# Patient Record
Sex: MALE | Race: OTHER | Employment: FULL TIME | URBAN - METROPOLITAN AREA
[De-identification: names, ages, dates, MRNs, and addresses within clinical notes are randomized per-mention and may not be internally consistent; named-entity substitution may affect disease eponyms.]

---

## 2024-02-27 ENCOUNTER — OFFICE VISIT (OUTPATIENT)
Dept: FAMILY MEDICINE CLINIC | Facility: CLINIC | Age: 74
End: 2024-02-27
Payer: MEDICARE

## 2024-02-27 ENCOUNTER — LAB ENCOUNTER (OUTPATIENT)
Dept: LAB | Age: 74
End: 2024-02-27
Attending: FAMILY MEDICINE
Payer: MEDICARE

## 2024-02-27 VITALS
HEART RATE: 79 BPM | SYSTOLIC BLOOD PRESSURE: 108 MMHG | DIASTOLIC BLOOD PRESSURE: 62 MMHG | BODY MASS INDEX: 30.42 KG/M2 | HEIGHT: 74 IN | TEMPERATURE: 98 F | RESPIRATION RATE: 18 BRPM | WEIGHT: 237 LBS

## 2024-02-27 DIAGNOSIS — R35.1 BPH ASSOCIATED WITH NOCTURIA: ICD-10-CM

## 2024-02-27 DIAGNOSIS — Z12.11 SCREEN FOR COLON CANCER: ICD-10-CM

## 2024-02-27 DIAGNOSIS — R73.09 ELEVATED GLUCOSE: Primary | ICD-10-CM

## 2024-02-27 DIAGNOSIS — Z00.00 ENCOUNTER FOR ANNUAL WELLNESS EXAM IN MEDICARE PATIENT: Primary | ICD-10-CM

## 2024-02-27 DIAGNOSIS — R73.09 ELEVATED GLUCOSE: ICD-10-CM

## 2024-02-27 DIAGNOSIS — E78.00 HYPERCHOLESTEROLEMIA: ICD-10-CM

## 2024-02-27 DIAGNOSIS — R58 ECCHYMOSIS: ICD-10-CM

## 2024-02-27 DIAGNOSIS — H26.9 CATARACT, UNSPECIFIED CATARACT TYPE, UNSPECIFIED LATERALITY: ICD-10-CM

## 2024-02-27 DIAGNOSIS — N40.1 BPH ASSOCIATED WITH NOCTURIA: ICD-10-CM

## 2024-02-27 LAB
ALBUMIN SERPL-MCNC: 3.9 G/DL (ref 3.4–5)
ALBUMIN/GLOB SERPL: 1 {RATIO} (ref 1–2)
ALP LIVER SERPL-CCNC: 77 U/L
ALT SERPL-CCNC: 26 U/L
ANION GAP SERPL CALC-SCNC: 4 MMOL/L (ref 0–18)
AST SERPL-CCNC: 17 U/L (ref 15–37)
BASOPHILS # BLD AUTO: 0.05 X10(3) UL (ref 0–0.2)
BASOPHILS NFR BLD AUTO: 1 %
BILIRUB SERPL-MCNC: 0.7 MG/DL (ref 0.1–2)
BUN BLD-MCNC: 16 MG/DL (ref 9–23)
CALCIUM BLD-MCNC: 9.4 MG/DL (ref 8.5–10.1)
CHLORIDE SERPL-SCNC: 109 MMOL/L (ref 98–112)
CHOLEST SERPL-MCNC: 216 MG/DL (ref ?–200)
CO2 SERPL-SCNC: 25 MMOL/L (ref 21–32)
COMPLEXED PSA SERPL-MCNC: 0.78 NG/ML (ref ?–4)
CREAT BLD-MCNC: 1.2 MG/DL
EGFRCR SERPLBLD CKD-EPI 2021: 65 ML/MIN/1.73M2 (ref 60–?)
EOSINOPHIL # BLD AUTO: 0.23 X10(3) UL (ref 0–0.7)
EOSINOPHIL NFR BLD AUTO: 4.7 %
ERYTHROCYTE [DISTWIDTH] IN BLOOD BY AUTOMATED COUNT: 13.1 %
FASTING PATIENT LIPID ANSWER: YES
FASTING STATUS PATIENT QL REPORTED: YES
GLOBULIN PLAS-MCNC: 4.1 G/DL (ref 2.8–4.4)
GLUCOSE BLD-MCNC: 111 MG/DL (ref 70–99)
HCT VFR BLD AUTO: 41.6 %
HDLC SERPL-MCNC: 33 MG/DL (ref 40–59)
HGB BLD-MCNC: 14.1 G/DL
IMM GRANULOCYTES # BLD AUTO: 0.02 X10(3) UL (ref 0–1)
IMM GRANULOCYTES NFR BLD: 0.4 %
LDLC SERPL CALC-MCNC: 141 MG/DL (ref ?–100)
LYMPHOCYTES # BLD AUTO: 1.91 X10(3) UL (ref 1–4)
LYMPHOCYTES NFR BLD AUTO: 39 %
MCH RBC QN AUTO: 31.1 PG (ref 26–34)
MCHC RBC AUTO-ENTMCNC: 33.9 G/DL (ref 31–37)
MCV RBC AUTO: 91.8 FL
MONOCYTES # BLD AUTO: 0.47 X10(3) UL (ref 0.1–1)
MONOCYTES NFR BLD AUTO: 9.6 %
NEUTROPHILS # BLD AUTO: 2.22 X10 (3) UL (ref 1.5–7.7)
NEUTROPHILS # BLD AUTO: 2.22 X10(3) UL (ref 1.5–7.7)
NEUTROPHILS NFR BLD AUTO: 45.3 %
NONHDLC SERPL-MCNC: 183 MG/DL (ref ?–130)
OSMOLALITY SERPL CALC.SUM OF ELEC: 288 MOSM/KG (ref 275–295)
PLATELET # BLD AUTO: 229 10(3)UL (ref 150–450)
POTASSIUM SERPL-SCNC: 4.1 MMOL/L (ref 3.5–5.1)
PROT SERPL-MCNC: 8 G/DL (ref 6.4–8.2)
RBC # BLD AUTO: 4.53 X10(6)UL
SODIUM SERPL-SCNC: 138 MMOL/L (ref 136–145)
TRIGL SERPL-MCNC: 231 MG/DL (ref 30–149)
VLDLC SERPL CALC-MCNC: 43 MG/DL (ref 0–30)
WBC # BLD AUTO: 4.9 X10(3) UL (ref 4–11)

## 2024-02-27 PROCEDURE — 96160 PT-FOCUSED HLTH RISK ASSMT: CPT | Performed by: FAMILY MEDICINE

## 2024-02-27 PROCEDURE — 3008F BODY MASS INDEX DOCD: CPT | Performed by: FAMILY MEDICINE

## 2024-02-27 PROCEDURE — 1125F AMNT PAIN NOTED PAIN PRSNT: CPT | Performed by: FAMILY MEDICINE

## 2024-02-27 PROCEDURE — 1159F MED LIST DOCD IN RCRD: CPT | Performed by: FAMILY MEDICINE

## 2024-02-27 PROCEDURE — G0439 PPPS, SUBSEQ VISIT: HCPCS | Performed by: FAMILY MEDICINE

## 2024-02-27 PROCEDURE — 80061 LIPID PANEL: CPT

## 2024-02-27 PROCEDURE — 85025 COMPLETE CBC W/AUTO DIFF WBC: CPT

## 2024-02-27 PROCEDURE — 3074F SYST BP LT 130 MM HG: CPT | Performed by: FAMILY MEDICINE

## 2024-02-27 PROCEDURE — 83036 HEMOGLOBIN GLYCOSYLATED A1C: CPT

## 2024-02-27 PROCEDURE — 1160F RVW MEDS BY RX/DR IN RCRD: CPT | Performed by: FAMILY MEDICINE

## 2024-02-27 PROCEDURE — 3078F DIAST BP <80 MM HG: CPT | Performed by: FAMILY MEDICINE

## 2024-02-27 PROCEDURE — 80053 COMPREHEN METABOLIC PANEL: CPT

## 2024-02-27 PROCEDURE — 1170F FXNL STATUS ASSESSED: CPT | Performed by: FAMILY MEDICINE

## 2024-02-27 NOTE — PROGRESS NOTES
Subjective:   Ron Sawant is a 73 year old male who presents for a Medicare Initial Preventative Physical Exam (Welcome to Medicare- < 12 months on Medicare) and good health with no chronic problems.   Presents to [facility] care.  The patient states that he has been out of the country and has recently returned.  States that he has not been on any medications states that he did have a screening colonoscopy about 8 years ago.  Has had a discectomy of L4-L5 does have a remote smoking history.  Denies any chest pain nausea vomiting diarrhea constipation.    History/Other:   Fall Risk Assessment:   He has been screened for Falls and is High Risk. Fall Prevention information provided to patient in After Visit Summary.    Do you feel unsteady when standing or walking?: No  Do you worry about falling?: No  Have you fallen in the past year?: Yes  How many times have you fallen?: 3  Were you injured?: Yes     Cognitive Assessment:   Abnormal  What day of the week is this?: Correct  What month is it?: Correct  What year is it?: Correct  Recall \"Ball\": Correct  Recall \"Flag\": Correct  Recall \"Tree\": Incorrect    Functional Ability/Status:   Ron Sawant has some abnormal functions as listed below:  He has Hearing problems based on screening of functional status.      Depression Screening (PHQ-2/PHQ-9): PHQ-2 SCORE: 0  , done 2/27/2024         Advanced Directives:   He does NOT have a Living Will. [Do you have a living will?: No]  He does NOT have a Power of  for Health Care. [Do you have a healthcare power of ?: No]  Discussed Advance Care Planning with patient (and family/surrogate if present). Standard forms made available to patient in After Visit Summary.      Patient Active Problem List   Diagnosis    Flatulence, eructation and gas pain     Allergies:  He has No Known Allergies.    Current Medications:  No outpatient medications have been marked as taking for the 2/27/24 encounter (Office Visit) with  Donnie Ghosh MD.       Medical History:  He  has no past medical history on file.  Surgical History:  He  has a past surgical history that includes Lumbar discectomy.   Family History:  His family history is not on file.  Social History:  He  reports that he quit smoking about 13 years ago. His smoking use included cigarettes. He has never used smokeless tobacco. He reports that he does not drink alcohol and does not use drugs.    Tobacco:  He smoked tobacco in the past but quit greater than 12 months ago.  Social History    Tobacco Use      Smoking status: Former        Types: Cigarettes        Quit date:         Years since quittin.1      Smokeless tobacco: Never       CAGE Alcohol Screen:   CAGE screening score of 0 on 2024, showing low risk of alcohol abuse.      Patient Care Team:  Donnie Ghosh MD as PCP - General (Family Medicine)    Review of Systems  Consitutional: No fevers, chills, sweats  Eye: No recent visual problems  ENMT: No ear pain nasal congestion sore throat  Respiratory: No shortness of breath, cough  Cardiovascular: No chest pain palpitations syncope  Gastrointestinal: No nausea vomiting diarrhea  Genitourinary: No hematuria  Hema/Lymph no bruising tendency, swollen lymph glands  Endocrine: Negative for excessive thirst excessive hunger  Musculoskeletal: No back pain neck pain joint pain muscle pain decreased range of motion  Integumentary: No rash, pruritus, abrasions  Neurologic: Alert and oriented x4  Psychiatric: No anxiety, depression    Medical, surgical, family, and social histories were reviewed      Objective:   Physical Exam      VITALS: Vitals reviewed   GENERAL: well developed, well nourished, in no apparent distress  SKIN: no rashes, no suspicious lesions: Cool and Dry  HEENT: atraumatic, normocephalic, ears and throat are clear bilateral tympanic membranes lucent nonbulging intact nose without bleeding purulent discharge or septal hematoma.    EYES: Pupils  equal round and reactive.  Extraocular motions intact no scleral icterus no injection or drainage  THROAT without erythema tonsillar hypertrophy or exudate.  Uvula midline airway patent                Hearing Assessed via: Whispered Voice  NECK: trachea midline.  No JVD or lymphadenopathy supple nontender no meningeal signs   LUNGS: clear to auscultation sounds equal bilaterally no wheezes rales or rhonchi  CARDIO: Regular rate and rhythm without murmurs gallops or rubs  GI: Soft nontender nondistended no hepatomegaly palpable masses.  No guarding.   without deformity or crepitus no flank tenderness  EXTREMITIES: no cyanosis, clubbing or edema no joint tenderness effusion or edema noted.  No calf tenderness negative Homans' sign bilaterally  NEURO: Awake and alert.  Cranial nerves II through XII intact motor and sensory grossly within normal limits.  5 out of 5 muscle strength in all muscle groups.  Normal speech.  PSYCHIATRIC: Awake, alert and oriented x3, cooperative appropriate mood and affect.  Judgment intact        /62 (BP Location: Right arm, Patient Position: Sitting, Cuff Size: adult)   Pulse 79   Temp 97.6 °F (36.4 °C) (Temporal)   Resp 18   Ht 6' 2\" (1.88 m)   Wt 237 lb (107.5 kg)   BMI 30.43 kg/m²  Estimated body mass index is 30.43 kg/m² as calculated from the following:    Height as of this encounter: 6' 2\" (1.88 m).    Weight as of this encounter: 237 lb (107.5 kg).    Medicare Hearing Assessment:       Visual Acuity:   Right Eye Visual Acuity: Uncorrected Right Eye Chart Acuity: 20/40   Left Eye Visual Acuity: Uncorrected Left Eye Chart Acuity: 20/70   Both Eyes Visual Acuity: Uncorrected Both Eyes Chart Acuity: 20/40   Able To Tolerate Visual Acuity: Yes        Assessment & Plan:   Ron Sawant is a 73 year old male who presents for a Medicare Assessment.     1. Encounter for annual wellness exam in Medicare patient (Primary)    I did discuss with the patient at this time  would be updating blood work you need a CMP lipid panel CBC as well as PSA.  He was asked to update his immunizations he was asked to follow-up at the pharmacy to complete his shingles vaccine series.  He is up-to-date with all other immunizations.  Will also be referring him to have a Cologuard completed he denies any family history of prostate or colon cancer.  Was asked to follow-up yearly for regular physical exams or for any other acute concerns.  Also be referring him to ophthalmology as he states he was previously told he had a cataracts of his left eye.          2. Elevated glucose  3. BPH associated with nocturia  -     PSA Total, Screen; Future; Expected date: 02/27/2024  4. Hypercholesterolemia  -     Comp Metabolic Panel (14); Future; Expected date: 02/27/2024  -     Lipid Panel; Future; Expected date: 02/27/2024  5. Screen for colon cancer  6. Ecchymosis  -     CBC With Differential With Platelet; Future; Expected date: 02/27/2024  7. Cataract, unspecified cataract type, unspecified laterality  -     Ophthalmology Referral - In Network        The patient presented today for Medicare annual wellness visit.  During the course of today's visit the health risk assessment past medical family and social histories were updated and reviewed with the patient.  The patient was educated and counseled about appropriate screening and preventative services and these were ordered as appropriate.  Referrals for educational and counseling services were made where indicated.  Advance directives were discussed.  A copy of the recommended preventative screenings as well as discharge instructions were provided to the patient.  End-of-life planning was discussed advanced directives were also discussed and are in place.          The patient indicates understanding of these issues and agrees to the plan.  Lab work ordered.  Patient reassured.  Reinforced healthy diet, lifestyle, and exercise.      No follow-ups on file.      Donnie Ghosh MD, 2/27/2024     Supplementary Documentation:   General Health:  In the past six months, have you lost more than 10 pounds without trying?: 2 - No  Has your appetite been poor?: No  Type of Diet: Balanced  How does the patient maintain a good energy level?: Daily Walks;Stretching  How would you describe your daily physical activity?: Light  How would you describe your current health state?: Good  How do you maintain positive mental well-being?: Social Interaction;Visiting Family;Puzzles;Games;Visiting Friends (Zoroastrianism and brody)  On a scale of 0 to 10, with 0 being no pain and 10 being severe pain, what is your pain level?: 5 - (Moderate) (Low back pain)  In the past six months, have you experienced urine leakage?: 1-Yes (Not a lot but noticable)  At any time do you feel concerned for the safety/well-being of yourself and/or your children, in your home or elsewhere?: No  Have you had any immunizations at another office such as Influenza, Hepatitis B, Tetanus, or Pneumococcal?: No        Ron Sawant's SCREENING SCHEDULE   Tests on this list are recommended by your physician but may not be covered, or covered at this frequency, by your insurer.   Please check with your insurance carrier before scheduling to verify coverage.   PREVENTATIVE SERVICES FREQUENCY &  COVERAGE DETAILS LAST COMPLETION DATE   Diabetes Screening    Fasting Blood Sugar / Glucose    One screening every 12 months if never tested or if previously tested but not diagnosed with pre-diabetes   One screening every 6 months if diagnosed with pre-diabetes Lab Results   Component Value Date     (H) 01/03/2012        Cardiovascular Disease Screening    Lipid Panel  Cholesterol  Lipoprotein (HDL)  Triglycerides Covered every 5 years for all Medicare beneficiaries without apparent signs or symptoms of cardiovascular disease No results found for: \"CHOLEST\", \"HDL\", \"LDL\", \"LDLD\", \"LDLC\", \"TRIG\"      Electrocardiogram (EKG)    Covered if needed at Welcome to Medicare, and non-screening if indicated for medical reasons 01/03/2012      Ultrasound Screening for Abdominal Aortic Aneurysm (AAA) Covered once in a lifetime for one of the following risk factors    Men who are 65-75 years old and have ever smoked    Anyone with a family history -     Colorectal Cancer Screening  Covered for ages 50-85; only need ONE of the following:    Colonoscopy   Covered every 10 years    Covered every 2 years if patient is at high risk or previous colonoscopy was abnormal -    Health Maintenance   Topic Date Due    Colorectal Cancer Screening  Never done       Flexible Sigmoidoscopy   Covered every 4 years -    Fecal Occult Blood Test Covered annually -   Prostate Cancer Screening    Prostate-Specific Antigen (PSA) Annually No results found for: \"PSA\"  Health Maintenance   Topic Date Due    PSA  Never done      Immunizations    Influenza Covered once per flu season  Please get every year 10/31/2023  No recommendations at this time    Pneumococcal Each vaccine ([id number] & [id number]) covered once after 65 Prevnar 13: -    [id number]: -     No recommendations at this time    Hepatitis B One screening covered for patients with certain risk factors   -  No recommendations at this time    Tetanus Toxoid Not covered by Medicare Part B unless medically necessary (cut with metal); may be covered with your pharmacy prescription benefits -    Tetanus, Diptheria and Pertusis TD and TDaP Not covered by Medicare Part B -  No recommendations at this time    Zoster Not covered by Medicare Part B; may be covered with your pharmacy  prescription benefits -  Zoster Vaccines(1 of 2) Never done

## 2024-02-29 ENCOUNTER — OFFICE VISIT (OUTPATIENT)
Dept: FAMILY MEDICINE CLINIC | Facility: CLINIC | Age: 74
End: 2024-02-29
Payer: MEDICARE

## 2024-02-29 VITALS
DIASTOLIC BLOOD PRESSURE: 76 MMHG | HEART RATE: 70 BPM | RESPIRATION RATE: 18 BRPM | WEIGHT: 237 LBS | HEIGHT: 74 IN | BODY MASS INDEX: 30.42 KG/M2 | SYSTOLIC BLOOD PRESSURE: 114 MMHG | TEMPERATURE: 98 F

## 2024-02-29 DIAGNOSIS — E78.00 HYPERCHOLESTEROLEMIA: ICD-10-CM

## 2024-02-29 DIAGNOSIS — Z12.11 COLON CANCER SCREENING: ICD-10-CM

## 2024-02-29 LAB
EST. AVERAGE GLUCOSE BLD GHB EST-MCNC: 114 MG/DL (ref 68–126)
HBA1C MFR BLD: 5.6 % (ref ?–5.7)

## 2024-02-29 PROCEDURE — 3008F BODY MASS INDEX DOCD: CPT | Performed by: FAMILY MEDICINE

## 2024-02-29 PROCEDURE — 1159F MED LIST DOCD IN RCRD: CPT | Performed by: FAMILY MEDICINE

## 2024-02-29 PROCEDURE — 1170F FXNL STATUS ASSESSED: CPT | Performed by: FAMILY MEDICINE

## 2024-02-29 PROCEDURE — 3078F DIAST BP <80 MM HG: CPT | Performed by: FAMILY MEDICINE

## 2024-02-29 PROCEDURE — 99214 OFFICE O/P EST MOD 30 MIN: CPT | Performed by: FAMILY MEDICINE

## 2024-02-29 PROCEDURE — 1160F RVW MEDS BY RX/DR IN RCRD: CPT | Performed by: FAMILY MEDICINE

## 2024-02-29 PROCEDURE — 3074F SYST BP LT 130 MM HG: CPT | Performed by: FAMILY MEDICINE

## 2024-02-29 RX ORDER — LOVASTATIN 40 MG/1
40 TABLET ORAL NIGHTLY
Qty: 90 TABLET | Refills: 0 | Status: SHIPPED | OUTPATIENT
Start: 2024-02-29 | End: 2024-05-29

## 2024-02-29 NOTE — PROGRESS NOTES
[facility] Office Note  Chief Complaint:   Chief Complaint   Patient presents with    Test Results     Pt had labs completed on 2024        HPI:   This is a 73 year old male coming in for lab review patient denies any acute concerns today states that he has been doing well.      No past medical history on file.  Past Surgical History:   Procedure Laterality Date    LUMBAR DISCECTOMY       Social History:  Social History     Socioeconomic History    Marital status:    Tobacco Use    Smoking status: Former     Types: Cigarettes     Quit date:      Years since quittin.1    Smokeless tobacco: Never   Vaping Use    Vaping Use: Never used   Substance and Sexual Activity    Alcohol use: Never    Drug use: Never    Sexual activity: Yes     Partners: Female   Other Topics Concern    Caffeine Concern No    Exercise No    Seat Belt Yes     Family History:  No family history on file.  Allergies:  No Known Allergies  Current Meds:  Current Outpatient Medications   Medication Sig Dispense Refill    Lovastatin 40 MG Oral Tab Take 1 tablet (40 mg total) by mouth nightly. 90 tablet 0      Counseling given: Not Answered       REVIEW OF SYSTEMS:   Consitutional: No fevers, chills, sweats  Eye: No recent visual problems  ENMT: No ear pain nasal congestion sore throat  Respiratory: No shortness of breath, cough  Cardiovascular: No chest pain palpitations syncope  Gastrointestinal: No nausea vomiting diarrhea  Genitourinary: No hematuria  Hema/Lymph no bruising tendency, swollen lymph glands  E     Medical, surgical, family, and social histories were reviewed      EXAM:   VITALS:   Vitals:    24 0947   BP: 114/76   Pulse: 70   Resp: 18   Temp: 97.5 °F (36.4 °C)      GENERAL: well developed, well nourished, in no apparent distress  SKIN: no rashes, no suspicious lesions: Cool and Dry  HEENT: atraumatic, normocephalic, ears and throat are clear    Ears: TM's clear and visible  bilaterally, no excess cerumen or erythema.   EYES: Pupils equal round and reactive.  Extraocular motions intact no scleral icterus no injection or drainage  THROAT without erythema tonsillar hypertrophy or exudate.  Uvula midline airway patent  NECK: Given midline.  No JVD or lymphadenopathy supple nontender no meningeal signs   LUNGS: clear to auscultation sounds equal bilaterally no wheezes rales or rhonchi  CARDIO: Regular rate and rhythm without murmurs gallops or rubs       ASSESSMENT AND PLAN:   1. Hypercholesterolemia  - Lovastatin 40 MG Oral Tab; Take 1 tablet (40 mg total) by mouth nightly.  Dispense: 90 tablet; Refill: 0  - Comp Metabolic Panel (14); Future  - Lipid Panel; Future  I did discuss with the patient at this time his LDL as well as triglycerides are elevated I did ask him to watch his fatty food fried food intake.  Will be starting him on lovastatin 40 mg once a day he was asked to increase his activity level as well as to follow-up in the next 3 months to repeat his blood work you need a fasting lipid panel as well as a CMP.  2. Colon cancer screening  [facility]           Meds & Refills for this Visit:  Requested Prescriptions     Signed Prescriptions Disp Refills    Lovastatin 40 MG Oral Tab 90 tablet 0     Sig: Take 1 tablet (40 mg total) by mouth nightly.       Health Maintenance:  Health Maintenance Due   Topic Date Due    Colorectal Cancer Screening  Never done    Zoster Vaccines (1 of 2) Never done    COVID-19 Vaccine (1 - 2023-24 season) Never done       Patient/Caregiver Education: Patient/Caregiver Education: There are no barriers to learning. Medical education done.   Outcome: Patient verbalizes understanding. Patient is notified to call with any questions, complications, allergies, or worsening or changing symptoms.  Patient is to call with any side effects or complications from the treatments as a result of today.     Problem List:  Patient  Active Problem List   Diagnosis    Flatulence, eructation and gas pain         No follow-ups on file.     Donnie Ghosh MD    Please note that portions of this note may have been completed with a voice recognition program. Efforts were made to edit the dictations but occasionally words are mis-transcribed.

## 2024-03-25 LAB — AMB EXT COLOGUARD RESULT: NEGATIVE

## 2024-03-29 ENCOUNTER — TELEPHONE (OUTPATIENT)
Dept: FAMILY MEDICINE CLINIC | Facility: CLINIC | Age: 74
End: 2024-03-29

## 2024-03-29 NOTE — TELEPHONE ENCOUNTER
Called and informed Pt of Cologuard results with provider's feedback.  Pt voiced understanding and he has no further questions/concerns at this time.

## 2024-05-01 ENCOUNTER — TELEPHONE (OUTPATIENT)
Dept: FAMILY MEDICINE CLINIC | Facility: CLINIC | Age: 74
End: 2024-05-01

## 2024-05-01 NOTE — TELEPHONE ENCOUNTER
Cologuard was done on 03/25/24. Results posted under care everywhere. Results is negative. Please acknowledge once reviewed.

## 2024-07-25 ENCOUNTER — TELEPHONE (OUTPATIENT)
Dept: FAMILY MEDICINE CLINIC | Facility: CLINIC | Age: 74
End: 2024-07-25

## 2024-07-25 NOTE — TELEPHONE ENCOUNTER
Unable to reach patient for medication adherence consult. LVM for patient to call me back at [phone].

## 2024-07-26 NOTE — TELEPHONE ENCOUNTER
Reached patient for medication adherence consult. Patient overdue for refill on lovastatin per insurance report.    Patient reports that he is not taking the lovastatin. He states that he was taking it  but never refilled it because he was under the impression that it was just for a short period and was supposed to have his cholesterol re-checked. Patient did not have his cholesterol re-checked due to personal circumstances. Patient not willing to restart statin at this time. He states that he wants to get his lipid panel done and speak with his PCP prior to restarting statin.    Provided education on importance of adherence. Patient denies any questions or concerns with medication.

## 2024-09-11 ENCOUNTER — OFFICE VISIT (OUTPATIENT)
Dept: FAMILY MEDICINE CLINIC | Facility: CLINIC | Age: 74
End: 2024-09-11
Payer: MEDICARE

## 2024-09-11 VITALS
DIASTOLIC BLOOD PRESSURE: 68 MMHG | HEART RATE: 54 BPM | RESPIRATION RATE: 18 BRPM | BODY MASS INDEX: 30.54 KG/M2 | TEMPERATURE: 98 F | SYSTOLIC BLOOD PRESSURE: 102 MMHG | WEIGHT: 238 LBS | HEIGHT: 74 IN

## 2024-09-11 DIAGNOSIS — E78.00 HYPERCHOLESTEROLEMIA: ICD-10-CM

## 2024-09-11 DIAGNOSIS — R42 VERTIGO: ICD-10-CM

## 2024-09-11 PROCEDURE — 3074F SYST BP LT 130 MM HG: CPT | Performed by: FAMILY MEDICINE

## 2024-09-11 PROCEDURE — G2211 COMPLEX E/M VISIT ADD ON: HCPCS | Performed by: FAMILY MEDICINE

## 2024-09-11 PROCEDURE — 3008F BODY MASS INDEX DOCD: CPT | Performed by: FAMILY MEDICINE

## 2024-09-11 PROCEDURE — 3078F DIAST BP <80 MM HG: CPT | Performed by: FAMILY MEDICINE

## 2024-09-11 PROCEDURE — 1159F MED LIST DOCD IN RCRD: CPT | Performed by: FAMILY MEDICINE

## 2024-09-11 PROCEDURE — 1160F RVW MEDS BY RX/DR IN RCRD: CPT | Performed by: FAMILY MEDICINE

## 2024-09-11 PROCEDURE — 99214 OFFICE O/P EST MOD 30 MIN: CPT | Performed by: FAMILY MEDICINE

## 2024-09-11 RX ORDER — MECLIZINE HCL 12.5 MG 12.5 MG/1
12.5 TABLET ORAL 3 TIMES DAILY PRN
Qty: 30 TABLET | Refills: 0 | Status: SHIPPED | OUTPATIENT
Start: 2024-09-11

## 2024-09-11 RX ORDER — LOVASTATIN 40 MG
40 TABLET ORAL NIGHTLY
COMMUNITY
End: 2024-09-11 | Stop reason: ALTCHOICE

## 2024-09-11 RX ORDER — FLUTICASONE PROPIONATE 50 MCG
1 SPRAY, SUSPENSION (ML) NASAL 2 TIMES DAILY
Qty: 1 EACH | Refills: 0 | Status: SHIPPED | OUTPATIENT
Start: 2024-09-11 | End: 2025-09-06

## 2024-09-11 RX ORDER — PREDNISONE 50 MG/1
50 TABLET ORAL DAILY
Qty: 5 TABLET | Refills: 0 | Status: SHIPPED | OUTPATIENT
Start: 2024-09-11 | End: 2024-09-16

## 2024-09-11 RX ORDER — GABAPENTIN 300 MG/1
1 CAPSULE ORAL 3 TIMES DAILY
COMMUNITY

## 2024-09-11 NOTE — PROGRESS NOTES
[facility] Family [facility] Office Note  Chief Complaint:   Chief Complaint   Patient presents with    Dizziness     Pt sts notice that he has been getting dizzy when he lays down or turn his head suddenly.      Ear Problem     Pt sts hasTinnitus has worsen in the last few months. But much worsen since the dizziness started.        HPI:   This is a 74 year old male coming in for episodes of dizziness the patient states that this has been going on for the last couple weeks.  States that he gets dizzy whenever he lays down his head or turns his head suddenly.  He has not had any nausea associated with this denies any other symptoms denies any hearing loss.  He does have a history of hypercholesterolemia.      No past medical history on file.  Past Surgical History:   Procedure Laterality Date    Lumbar discectomy       Social History:  Social History     Socioeconomic History    Marital status:    Tobacco Use    Smoking status: Former     Current packs/day: 0.00     Types: Cigarettes     Quit date:      Years since quittin.    Smokeless tobacco: Never   Vaping Use    Vaping status: Never Used   Substance and Sexual Activity    Alcohol use: Never    Drug use: Never    Sexual activity: Yes     Partners: Female   Other Topics Concern    Caffeine Concern No    Exercise No    Seat Belt Yes     Family History:  No family history on file.  Allergies:  No Known Allergies  Current Meds:  Current Outpatient Medications   Medication Sig Dispense Refill    gabapentin 300 MG Oral Cap Take 1 capsule (300 mg total) by mouth 3 (three) times daily.      predniSONE 50 MG Oral Tab Take 1 tablet (50 mg total) by mouth daily for 5 days. 5 tablet 0    fluticasone propionate 50 MCG/ACT Nasal Suspension 1 spray by Each Nare route in the morning and 1 spray before bedtime. 1 each 0    meclizine 12.5 MG Oral Tab Take 1 tablet (12.5 mg total) by mouth 3 (three) times daily as needed. 30 tablet 0      Counseling given:  Not Answered       REVIEW OF SYSTEMS:   Consitutional: No fevers, chills, sweats  Eye: No recent visual problems  ENMT: No ear pain nasal congestion sore throat positive dizziness  Respiratory: No shortness of breath, cough  Cardiovascular: No chest pain palpitations syncope  Gastrointestinal: No nausea vomiting diarrhea  Genitourinary: No hematuria  Hema/Lymph no bruising tendency, swollen lymph glands  Endocrine: Negative for excessive thirst excessive hunger      Medical, surgical, family, and social histories were reviewed      EXAM:   VITALS:   Vitals:    09/11/24 0728   BP: 102/68   Pulse: 54   Resp: 18   Temp: 97.7 °F (36.5 °C)      GENERAL: well developed, well nourished, in no apparent distress  SKIN: no rashes, no suspicious lesions: Cool and Dry  HEENT: atraumatic, normocephalic, ears and throat are clear    Ears: TM's clear and visible bilaterally, no excess cerumen or erythema.   EYES: Pupils equal round and reactive.  Extraocular motions intact no scleral icterus no injection or drainage  THROAT without erythema tonsillar hypertrophy or exudate.  Uvula midline airway patent  NECK: Given midline.  No JVD or lymphadenopathy supple nontender no meningeal signs   LUNGS: clear to auscultation sounds equal bilaterally no wheezes rales or rhonchi  CARDIO: Regular rate and rhythm without murmurs gallops or rubs       ASSESSMENT AND PLAN:   1. Vertigo  - predniSONE 50 MG Oral Tab; Take 1 tablet (50 mg total) by mouth daily for 5 days.  Dispense: 5 tablet; Refill: 0  - fluticasone propionate 50 MCG/ACT Nasal Suspension; 1 spray by Each Nare route in the morning and 1 spray before bedtime.  Dispense: 1 each; Refill: 0  - meclizine 12.5 MG Oral Tab; Take 1 tablet (12.5 mg total) by mouth 3 (three) times daily as needed.  Dispense: 30 tablet; Refill: 0  I did discuss with the patient this is BPPV.  He was given a prescription for prednisone to be used once a day for 5 days as well as Flonase twice a day he was also  given meclizine he was just informed that this can cause some somnolence.  He was also given the Epley maneuvers to complete.  He was asked to follow-up if he has any continued symptoms.  2. Hypercholesterolemia  - LIPID PANEL [7600] [Q]; Future  - COMP METABOLIC PANEL [35411] [Q]; Future  - LIPID PANEL [7600] [Q]  - COMP METABOLIC PANEL [43069] [Q]  Did discuss with the patient he needs to update his blood work his CMP and lipid panel are due.  He was asked to watch his fatty food fried food intake once we have the results to go over them make any further recommendations.    Meds & Refills for this Visit:  Requested Prescriptions     Signed Prescriptions Disp Refills    predniSONE 50 MG Oral Tab 5 tablet 0     Sig: Take 1 tablet (50 mg total) by mouth daily for 5 days.    fluticasone propionate 50 MCG/ACT Nasal Suspension 1 each 0     Si spray by Each Nare route in the morning and 1 spray before bedtime.    meclizine 12.5 MG Oral Tab 30 tablet 0     Sig: Take 1 tablet (12.5 mg total) by mouth 3 (three) times daily as needed.       Health Maintenance:  Health Maintenance Due   Topic Date Due    Zoster Vaccines (1 of 2) Never done    COVID-19 Vaccine (2023- season) Never done       Patient/Caregiver Education: Patient/Caregiver Education: There are no barriers to learning. Medical education done.   Outcome: Patient verbalizes understanding. Patient is notified to call with any questions, complications, allergies, or worsening or changing symptoms.  Patient is to call with any side effects or complications from the treatments as a result of today.     Problem List:  Patient Active Problem List   Diagnosis    Flatulence, eructation and gas pain         No follow-ups on file.     Donnie Ghosh MD    Please note that portions of this note may have been completed with a voice recognition program. Efforts were made to edit the dictations but occasionally words are mis-transcribed.

## 2024-09-13 LAB
ALBUMIN/GLOBULIN RATIO: 1.3 (CALC) (ref 1–2.5)
ALBUMIN: 4.3 G/DL (ref 3.6–5.1)
ALKALINE PHOSPHATASE: 81 U/L (ref 35–144)
ALT: 21 U/L (ref 9–46)
AST: 14 U/L (ref 10–35)
BILIRUBIN, TOTAL: 0.5 MG/DL (ref 0.2–1.2)
BUN: 20 MG/DL (ref 7–25)
CALCIUM: 10.2 MG/DL (ref 8.6–10.3)
CARBON DIOXIDE: 25 MMOL/L (ref 20–32)
CHLORIDE: 104 MMOL/L (ref 98–110)
CHOL/HDLC RATIO: 5 (CALC)
CHOLESTEROL, TOTAL: 217 MG/DL
CREATININE: 1.04 MG/DL (ref 0.7–1.28)
EGFR: 76 ML/MIN/1.73M2
GLOBULIN: 3.2 G/DL (CALC) (ref 1.9–3.7)
GLUCOSE: 102 MG/DL (ref 65–99)
HDL CHOLESTEROL: 43 MG/DL
LDL-CHOLESTEROL: 147 MG/DL (CALC)
NON-HDL CHOLESTEROL: 174 MG/DL (CALC)
POTASSIUM: 4.5 MMOL/L (ref 3.5–5.3)
PROTEIN, TOTAL: 7.5 G/DL (ref 6.1–8.1)
SODIUM: 139 MMOL/L (ref 135–146)
TRIGLYCERIDES: 145 MG/DL

## 2024-09-16 ENCOUNTER — OFFICE VISIT (OUTPATIENT)
Dept: FAMILY MEDICINE CLINIC | Facility: CLINIC | Age: 74
End: 2024-09-16
Payer: MEDICARE

## 2024-09-16 VITALS
DIASTOLIC BLOOD PRESSURE: 76 MMHG | WEIGHT: 238 LBS | HEART RATE: 94 BPM | SYSTOLIC BLOOD PRESSURE: 114 MMHG | HEIGHT: 74 IN | RESPIRATION RATE: 18 BRPM | TEMPERATURE: 99 F | BODY MASS INDEX: 30.54 KG/M2

## 2024-09-16 DIAGNOSIS — E78.00 HYPERCHOLESTEROLEMIA: Primary | ICD-10-CM

## 2024-09-16 PROCEDURE — 3074F SYST BP LT 130 MM HG: CPT | Performed by: FAMILY MEDICINE

## 2024-09-16 PROCEDURE — 99214 OFFICE O/P EST MOD 30 MIN: CPT | Performed by: FAMILY MEDICINE

## 2024-09-16 PROCEDURE — G2211 COMPLEX E/M VISIT ADD ON: HCPCS | Performed by: FAMILY MEDICINE

## 2024-09-16 PROCEDURE — 3078F DIAST BP <80 MM HG: CPT | Performed by: FAMILY MEDICINE

## 2024-09-16 PROCEDURE — 1160F RVW MEDS BY RX/DR IN RCRD: CPT | Performed by: FAMILY MEDICINE

## 2024-09-16 PROCEDURE — 3008F BODY MASS INDEX DOCD: CPT | Performed by: FAMILY MEDICINE

## 2024-09-16 PROCEDURE — 1159F MED LIST DOCD IN RCRD: CPT | Performed by: FAMILY MEDICINE

## 2024-09-16 RX ORDER — LOVASTATIN 40 MG
40 TABLET ORAL NIGHTLY
Qty: 90 TABLET | Refills: 0 | Status: SHIPPED | OUTPATIENT
Start: 2024-09-16 | End: 2024-12-15

## 2024-09-16 NOTE — PROGRESS NOTES
[facility] Family Medicine Office Note  Chief Complaint:   Chief Complaint   Patient presents with    Lab Results     Labs completed on 2024       HPI:   This is a 74 year old male coming in for lab work.  The patient denies any acute concerns today.      No past medical history on file.  Past Surgical History:   Procedure Laterality Date    Lumbar discectomy       Social History:  Social History     Socioeconomic History    Marital status:    Tobacco Use    Smoking status: Former     Current packs/day: 0.00     Types: Cigarettes     Quit date:      Years since quittin.7    Smokeless tobacco: Never   Vaping Use    Vaping status: Never Used   Substance and Sexual Activity    Alcohol use: Never    Drug use: Never    Sexual activity: Yes     Partners: Female   Other Topics Concern    Caffeine Concern No    Exercise No    Seat Belt Yes     Family History:  No family history on file.  Allergies:  No Known Allergies  Current Meds:  Current Outpatient Medications   Medication Sig Dispense Refill    Lovastatin 40 MG Oral Tab Take 1 tablet (40 mg total) by mouth nightly. 90 tablet 0    gabapentin 300 MG Oral Cap Take 1 capsule (300 mg total) by mouth 3 (three) times daily.      predniSONE 50 MG Oral Tab Take 1 tablet (50 mg total) by mouth daily for 5 days. 5 tablet 0    fluticasone propionate 50 MCG/ACT Nasal Suspension 1 spray by Each Nare route in the morning and 1 spray before bedtime. 1 each 0    meclizine 12.5 MG Oral Tab Take 1 tablet (12.5 mg total) by mouth 3 (three) times daily as needed. 30 tablet 0      Counseling given: Not Answered       REVIEW OF SYSTEMS:   Consitutional: No fevers, chills, sweats  Eye: No recent visual problems  ENMT: No ear pain nasal congestion sore throat  Respiratory: No shortness of breath, cough  Cardiovascular: No chest pain palpitations syncope  Gastrointestinal: No nausea vomiting diarrhea  Genitourinary: No hematuria  Hema/Lymph no bruising tendency,  swollen lymph glands  Endocrine: Negative for excessive thirst excessive hunger  Musculoskeletal: No back pain neck pain joint pain muscle pain decreased range of motion       Medical, surgical, family, and social histories were reviewed      EXAM:   VITALS:   Vitals:    09/16/24 0924   BP: 114/76   Pulse: 94   Resp: 18   Temp: 98.7 °F (37.1 °C)      GENERAL: well developed, well nourished, in no apparent distress  SKIN: no rashes, no suspicious lesions: Cool and Dry  HEENT: atraumatic, normocephalic, ears and throat are clear    Ears: TM's clear and visible bilaterally, no excess cerumen or erythema.   EYES: Pupils equal round and reactive.  Extraocular motions intact no scleral icterus no injection or drainage  THROAT without erythema tonsillar hypertrophy or exudate.  Uvula midline airway patent  NECK: Given midline.  No JVD or lymphadenopathy supple nontender no meningeal signs   LUNGS: clear to auscultation sounds equal bilaterally no wheezes rales or rhonchi  CARDIO: Regular rate and rhythm without murmurs gallops or rubs      ASSESSMENT AND PLAN:   1. Hypercholesterolemia  - Lovastatin 40 MG Oral Tab; Take 1 tablet (40 mg total) by mouth nightly.  Dispense: 90 tablet; Refill: 0  - Comp Metabolic Panel (14); Future  - Lipid Panel; Future  I did discuss with the patient at this time his LDL is elevated I did suggest for him to start lovastatin 40 mg once a day.  He was asked to watch his fatty food fried food intake.  Will be updating his blood work in the next 3 months we need a CMP as well as lipid panel.    Meds & Refills for this Visit:  Requested Prescriptions     Signed Prescriptions Disp Refills    Lovastatin 40 MG Oral Tab 90 tablet 0     Sig: Take 1 tablet (40 mg total) by mouth nightly.       Health Maintenance:  Health Maintenance Due   Topic Date Due    Zoster Vaccines (1 of 2) Never done    COVID-19 Vaccine (1 - 2023-24 season) Never done       Patient/Caregiver Education: Patient/Caregiver  Education: There are no barriers to learning. Medical education done.   Outcome: Patient verbalizes understanding. Patient is notified to call with any questions, complications, allergies, or worsening or changing symptoms.  Patient is to call with any side effects or complications from the treatments as a result of today.     Problem List:  Patient Active Problem List   Diagnosis    Flatulence, eructation and gas pain         No follow-ups on file.     Donnie Ghosh MD    Please note that portions of this note may have been completed with a voice recognition program. Efforts were made to edit the dictations but occasionally words are mis-transcribed.

## 2025-01-20 ENCOUNTER — OFFICE VISIT (OUTPATIENT)
Dept: FAMILY MEDICINE CLINIC | Facility: CLINIC | Age: 75
End: 2025-01-20
Payer: MEDICARE

## 2025-01-20 ENCOUNTER — TELEPHONE (OUTPATIENT)
Dept: FAMILY MEDICINE CLINIC | Facility: CLINIC | Age: 75
End: 2025-01-20

## 2025-01-20 VITALS
DIASTOLIC BLOOD PRESSURE: 82 MMHG | OXYGEN SATURATION: 97 % | TEMPERATURE: 97 F | HEIGHT: 74 IN | HEART RATE: 80 BPM | SYSTOLIC BLOOD PRESSURE: 134 MMHG | WEIGHT: 242.63 LBS | RESPIRATION RATE: 16 BRPM | BODY MASS INDEX: 31.14 KG/M2

## 2025-01-20 DIAGNOSIS — J01.00 ACUTE MAXILLARY SINUSITIS, RECURRENCE NOT SPECIFIED: Primary | ICD-10-CM

## 2025-01-20 PROCEDURE — 99214 OFFICE O/P EST MOD 30 MIN: CPT | Performed by: FAMILY MEDICINE

## 2025-01-20 PROCEDURE — 3075F SYST BP GE 130 - 139MM HG: CPT | Performed by: FAMILY MEDICINE

## 2025-01-20 PROCEDURE — G2211 COMPLEX E/M VISIT ADD ON: HCPCS | Performed by: FAMILY MEDICINE

## 2025-01-20 PROCEDURE — 1160F RVW MEDS BY RX/DR IN RCRD: CPT | Performed by: FAMILY MEDICINE

## 2025-01-20 PROCEDURE — 3079F DIAST BP 80-89 MM HG: CPT | Performed by: FAMILY MEDICINE

## 2025-01-20 PROCEDURE — 3008F BODY MASS INDEX DOCD: CPT | Performed by: FAMILY MEDICINE

## 2025-01-20 PROCEDURE — 1159F MED LIST DOCD IN RCRD: CPT | Performed by: FAMILY MEDICINE

## 2025-01-20 RX ORDER — BENZONATATE 200 MG/1
200 CAPSULE ORAL 3 TIMES DAILY PRN
Qty: 40 CAPSULE | Refills: 0 | Status: SHIPPED | OUTPATIENT
Start: 2025-01-20

## 2025-01-20 RX ORDER — FLUTICASONE PROPIONATE 50 MCG
1 SPRAY, SUSPENSION (ML) NASAL 2 TIMES DAILY
Qty: 1 EACH | Refills: 0 | Status: SHIPPED | OUTPATIENT
Start: 2025-01-20 | End: 2026-01-15

## 2025-01-20 NOTE — TELEPHONE ENCOUNTER
1. What are your symptoms?  Cough difficulty breathing when the patient lays down. Patient had the flu previously.      2. How long have you been having these symptoms?  Patient has had symptoms for about 1 week    3. Have you done anything already to treat your symptoms?     Patient has used over the counter Dayquil/Nyquil.     ADDITIONAL INFO:  Please Advise

## 2025-01-20 NOTE — PROGRESS NOTES
[facility] Family Medicine Office Note  Chief Complaint:   Chief Complaint   Patient presents with    Cough       HPI:   This is a 74 year old male coming in for cough congestion sinus pressure.  Patient states that this has been going on for last 10 days states that he has had some wheezing episodes as well as some production to the cough.  He denies any shortness of breath denies any fever.      History reviewed. No pertinent past medical history.  Past Surgical History:   Procedure Laterality Date    Lumbar discectomy       Social History:  Social History     Socioeconomic History    Marital status:    Tobacco Use    Smoking status: Former     Current packs/day: 0.00     Types: Cigarettes     Quit date:      Years since quittin.0    Smokeless tobacco: Never   Vaping Use    Vaping status: Never Used   Substance and Sexual Activity    Alcohol use: Never    Drug use: Never    Sexual activity: Yes     Partners: Female   Other Topics Concern    Caffeine Concern No    Exercise No    Seat Belt Yes     Family History:  History reviewed. No pertinent family history.  Allergies:  Allergies[1]  Current Meds:  Current Outpatient Medications   Medication Sig Dispense Refill    amoxicillin clavulanate 875-125 MG Oral Tab Take 1 tablet by mouth 2 (two) times daily for 10 days. 20 tablet 0    fluticasone propionate 50 MCG/ACT Nasal Suspension 1 spray by Each Nare route in the morning and 1 spray before bedtime. 1 each 0    benzonatate 200 MG Oral Cap Take 1 capsule (200 mg total) by mouth 3 (three) times daily as needed for cough. 40 capsule 0      Counseling given: Not Answered       REVIEW OF SYSTEMS:   Consitutional: No fevers, chills, sweats  Eye: No recent visual problems  ENMT: No ear pain positive nasal congestion sore throat  Respiratory: No shortness of breath, positive cough  Cardiovascular: No chest pain palpitations syncope  Gastrointestinal: No nausea vomiting diarrhea  Genitourinary: No  hematuria  Hema/Lymph no bruising tendency, swollen lymph glands  Endocrine: Negative for excessive thirst excessive hunger       Medical, surgical, family, and social histories were reviewed      EXAM:   VITALS:   Vitals:    01/20/25 1137   BP: 134/82   Pulse: 80   Resp: 16   Temp: 97 °F (36.1 °C)      GENERAL: well developed, well nourished, in no apparent distress  SKIN: no rashes, no suspicious lesions: Cool and Dry  HEENT: atraumatic, normocephalic, ears and throat are clear    Ears: TM's clear and visible bilaterally, no excess cerumen or erythema.   EYES: Pupils equal round and reactive.  Extraocular motions intact no scleral icterus no injection or drainage  THROAT without erythema tonsillar hypertrophy or exudate.  Uvula midline airway patent  NECK: Given midline.  No JVD or lymphadenopathy supple nontender no meningeal signs   LUNGS: clear to auscultation sounds equal bilaterally no wheezes rales or rhonchi  CARDIO: Regular rate and rhythm without murmurs gallops or rubs       ASSESSMENT AND PLAN:   1. Acute maxillary sinusitis, recurrence not specified  - amoxicillin clavulanate 875-125 MG Oral Tab; Take 1 tablet by mouth 2 (two) times daily for 10 days.  Dispense: 20 tablet; Refill: 0  - fluticasone propionate 50 MCG/ACT Nasal Suspension; 1 spray by Each Nare route in the morning and 1 spray before bedtime.  Dispense: 1 each; Refill: 0  - benzonatate 200 MG Oral Cap; Take 1 capsule (200 mg total) by mouth 3 (three) times daily as needed for cough.  Dispense: 40 capsule; Refill: 0  I did discuss with the patient at this time would suggest starting him on Augmentin he was asked to use this twice a day for next 10 days he is also asked to use Flonase twice a day as well as benzonatate pills to help with the cough he was asked to follow-up if he has any continued symptoms.    Meds & Refills for this Visit:  Requested Prescriptions     Signed Prescriptions Disp Refills    amoxicillin clavulanate 875-125 MG  Oral Tab 20 tablet 0     Sig: Take 1 tablet by mouth 2 (two) times daily for 10 days.    fluticasone propionate 50 MCG/ACT Nasal Suspension 1 each 0     Si spray by Each Nare route in the morning and 1 spray before bedtime.    benzonatate 200 MG Oral Cap 40 capsule 0     Sig: Take 1 capsule (200 mg total) by mouth 3 (three) times daily as needed for cough.       Health Maintenance:  Health Maintenance Due   Topic Date Due    Zoster Vaccines (1 of 2) Never done    COVID-19 Vaccine (1 - 2024-25 season) Never done    Influenza Vaccine (1) 10/01/2024    Annual Well Visit  2025    Annual Depression Screening  2025    Fall Risk Screening (Annual)  2025       Patient/Caregiver Education: Patient/Caregiver Education: There are no barriers to learning. Medical education done.   Outcome: Patient verbalizes understanding. Patient is notified to call with any questions, complications, allergies, or worsening or changing symptoms.  Patient is to call with any side effects or complications from the treatments as a result of today.     Problem List:  Patient Active Problem List   Diagnosis    Flatulence, eructation and gas pain         No follow-ups on file.     Donnie Ghosh MD    Please note that portions of this note may have been completed with a voice recognition program. Efforts were made to edit the dictations but occasionally words are mis-transcribed.       [1] No Known Allergies

## 2025-01-20 NOTE — TELEPHONE ENCOUNTER
I spoke to the patient. He has a productive cough with post nasal drip. He can not sleep at night due to the cough. He had influenza over a week ago. Appointment made to see Dr. Ghosh today at 11:45 am. Pt. Agreed to plan and verbalized understanding

## 2025-04-23 ENCOUNTER — TELEPHONE (OUTPATIENT)
Dept: FAMILY MEDICINE CLINIC | Facility: CLINIC | Age: 75
End: 2025-04-23

## 2025-05-13 ENCOUNTER — OFFICE VISIT (OUTPATIENT)
Dept: FAMILY MEDICINE CLINIC | Facility: CLINIC | Age: 75
End: 2025-05-13
Payer: MEDICARE

## 2025-05-13 VITALS
HEART RATE: 79 BPM | BODY MASS INDEX: 33.72 KG/M2 | RESPIRATION RATE: 18 BRPM | TEMPERATURE: 98 F | SYSTOLIC BLOOD PRESSURE: 122 MMHG | HEIGHT: 72 IN | DIASTOLIC BLOOD PRESSURE: 68 MMHG | WEIGHT: 249 LBS

## 2025-05-13 DIAGNOSIS — H91.92 HEARING LOSS OF LEFT EAR, UNSPECIFIED HEARING LOSS TYPE: ICD-10-CM

## 2025-05-13 DIAGNOSIS — H93.12 TINNITUS OF LEFT EAR: ICD-10-CM

## 2025-05-13 DIAGNOSIS — Z12.5 SCREENING FOR MALIGNANT NEOPLASM OF PROSTATE: ICD-10-CM

## 2025-05-13 DIAGNOSIS — Z00.00 MEDICARE ANNUAL WELLNESS VISIT, SUBSEQUENT: ICD-10-CM

## 2025-05-13 DIAGNOSIS — M54.31 SCIATICA OF RIGHT SIDE: ICD-10-CM

## 2025-05-13 DIAGNOSIS — M51.26 HERNIATED LUMBAR INTERVERTEBRAL DISC: ICD-10-CM

## 2025-05-13 DIAGNOSIS — E78.00 HYPERCHOLESTEROLEMIA: ICD-10-CM

## 2025-05-13 DIAGNOSIS — R73.09 ELEVATED GLUCOSE: ICD-10-CM

## 2025-05-13 PROCEDURE — 96160 PT-FOCUSED HLTH RISK ASSMT: CPT | Performed by: FAMILY MEDICINE

## 2025-05-13 PROCEDURE — 1160F RVW MEDS BY RX/DR IN RCRD: CPT | Performed by: FAMILY MEDICINE

## 2025-05-13 PROCEDURE — G0439 PPPS, SUBSEQ VISIT: HCPCS | Performed by: FAMILY MEDICINE

## 2025-05-13 PROCEDURE — 99214 OFFICE O/P EST MOD 30 MIN: CPT | Performed by: FAMILY MEDICINE

## 2025-05-13 PROCEDURE — 1159F MED LIST DOCD IN RCRD: CPT | Performed by: FAMILY MEDICINE

## 2025-05-13 PROCEDURE — G2211 COMPLEX E/M VISIT ADD ON: HCPCS | Performed by: FAMILY MEDICINE

## 2025-05-13 PROCEDURE — 1170F FXNL STATUS ASSESSED: CPT | Performed by: FAMILY MEDICINE

## 2025-05-13 PROCEDURE — 3074F SYST BP LT 130 MM HG: CPT | Performed by: FAMILY MEDICINE

## 2025-05-13 PROCEDURE — 3008F BODY MASS INDEX DOCD: CPT | Performed by: FAMILY MEDICINE

## 2025-05-13 PROCEDURE — 3078F DIAST BP <80 MM HG: CPT | Performed by: FAMILY MEDICINE

## 2025-05-13 PROCEDURE — 1125F AMNT PAIN NOTED PAIN PRSNT: CPT | Performed by: FAMILY MEDICINE

## 2025-05-13 RX ORDER — PREDNISONE 50 MG/1
50 TABLET ORAL DAILY
Qty: 7 TABLET | Refills: 0 | Status: SHIPPED | OUTPATIENT
Start: 2025-05-13 | End: 2025-05-20

## 2025-05-13 NOTE — PROGRESS NOTES
Subjective:   Ron Sawant is a 75 year old male who presents for a MA AHA (Medicare Advantage Annual Health Assessment) and Medicare Subsequent Annual Wellness visit (Pt already had Initial Annual Wellness) and scheduled follow up of multiple significant but stable problems and acute exacerbation of a chronic illness.   History of Present Illness                Patient has a past medical history of hypercholesterolemia elevated glucose also has a history of multiple herniated lumbar intervertebral discs.  Patient states that recently has had some increased right-sided lower back discomfort.  He does have a history of sciatica and states that this is what is likely causing his discomfort.        History/Other:   Fall Risk Assessment:   He has been screened for Falls and is High Risk. Fall Prevention information provided to patient in After Visit Summary.    Do you feel unsteady when standing or walking?: No  Do you worry about falling?: No  Have you fallen in the past year?: Yes  How many times have you fallen?: 2  Were you injured?: No     Cognitive Assessment:   He had a completely normal cognitive assessment - see flowsheet entries     Functional Ability/Status:   Ron Sawant has some abnormal functions as listed below:  He has Dressing and/or Bathing issues based on screening of functional status.  Difficulty dressing or bathing?: Yes  Bathing or Showering: Need some help  Dressing: Need some help  He has Walking problems based on screening of functional status. He has problems with Daily Activities based on screening of functional status.       Depression Screening (PHQ):  PHQ-2 SCORE: 0  , done 5/13/2025         Advanced Directives:   He does NOT have a Living Will. [Do you have a living will?: No]  He does NOT have a Power of  for Health Care. [Do you have a healthcare power of ?: No]  Discussed Advance Care Planning with patient (and family/surrogate if present). Standard forms made  available to patient in After Visit Summary.      Problem List[1]  Allergies:  He has no known allergies.    Current Medications:  Active Meds, Sig Only[2]    Medical History:  He  has no past medical history on file.  Surgical History:  He  has a past surgical history that includes Lumbar discectomy.   Family History:  His family history is not on file.  Social History:  He  reports that he quit smoking about 14 years ago. His smoking use included cigarettes. He has never used smokeless tobacco. He reports that he does not drink alcohol and does not use drugs.    Tobacco:  He smoked tobacco in the past but quit greater than 12 months ago.  Tobacco Use[3]     CAGE Alcohol Screen:   CAGE screening score of 0 on 5/13/2025, showing low risk of alcohol abuse.      Patient Care Team:  Donnie Ghosh MD as PCP - General (Family Medicine)    Review of Systems  Consitutional: No fevers, chills, sweats  Eye: No recent visual problems  ENMT: No ear pain nasal congestion sore throat  Respiratory: No shortness of breath, cough  Cardiovascular: No chest pain palpitations syncope  Gastrointestinal: No nausea vomiting diarrhea  Genitourinary: No hematuria  Hema/Lymph no bruising tendency, swollen lymph glands  Endocrine: Negative for excessive thirst excessive hunger  Musculoskeletal: positive  back pain no neck pain joint pain muscle pain decreased range of motion  Integumentary: No rash, pruritus, abrasions  Neurologic: Alert and oriented x4  Psychiatric: No anxiety, depression    Medical, surgical, family, and social histories were reviewed      Objective:   Physical Exam      VITALS: Vitals reviewed   GENERAL: well developed, well nourished, in no apparent distress  SKIN: no rashes, no suspicious lesions: Cool and Dry  HEENT: atraumatic, normocephalic, ears and throat are clear bilateral tympanic membranes lucent nonbulging intact nose without bleeding purulent discharge or septal hematoma.    EYES: Pupils equal round and  reactive.  Extraocular motions intact no scleral icterus no injection or drainage  THROAT without erythema tonsillar hypertrophy or exudate.  Uvula midline airway patent                Hearing Assessed via: Whispered Voice  NECK: trachea midline.  No JVD or lymphadenopathy supple nontender no meningeal signs   LUNGS: clear to auscultation sounds equal bilaterally no wheezes rales or rhonchi  CARDIO: Regular rate and rhythm without murmurs gallops or rubs  GI: BS + Soft nontender nondistended no hepatomegaly palpable masses.  No guarding.  BACK non tender without deformity or crepitus no flank tenderness  EXTREMITIES: no cyanosis, clubbing or edema no joint tenderness effusion or edema noted.  No calf tenderness negative Homans' sign bilaterally  NEURO: Awake and alert.  Cranial nerves II through XII intact motor and sensory grossly within normal limits.  5 out of 5 muscle strength in all muscle groups.  Normal speech.  PSYCHIATRIC: Awake, alert and oriented x3, cooperative appropriate mood and affect.  Judgment intact        /68 (BP Location: Left arm, Patient Position: Sitting, Cuff Size: large)   Pulse 79   Temp 97.6 °F (36.4 °C) (Temporal)   Resp 18   Ht 6' (1.829 m)   Wt 249 lb (112.9 kg)   BMI 33.77 kg/m²  Estimated body mass index is 33.77 kg/m² as calculated from the following:    Height as of this encounter: 6' (1.829 m).    Weight as of this encounter: 249 lb (112.9 kg).    Medicare Hearing Assessment:   Hearing Screening    Time taken: 5/13/2025 10:55 AM  Entry User: She Rosas MA  Screening Method: Finger Rub  Finger Rub Result: Pass         Visual Acuity:   Right Eye Visual Acuity: Corrected Right Eye Chart Acuity: 20/30   Left Eye Visual Acuity: Corrected Left Eye Chart Acuity: 20/30   Both Eyes Visual Acuity: Corrected Both Eyes Chart Acuity: 20/30   Able To Tolerate Visual Acuity: Yes        Assessment & Plan:   Ron Sawant is a 75 year old male who presents for a Medicare  Assessment.     1. Medicare annual wellness visit, subsequent    I did discuss with the patient to update his blood work and need a CBC CMP free T4 free T3 TSH as well as lipid panel.  He was asked to follow-up yearly for regular physical exams or for any other acute concern.  I did also discuss for him to update a medical power of     2. Herniated lumbar intervertebral disc  -     CBC With Differential With Platelet; Future; Expected date: 05/13/2025  -     Comp Metabolic Panel (14); Future; Expected date: 05/13/2025  -     Lipid Panel; Future; Expected date: 05/13/2025  -     Triiodothyronine (T3) Total; Future; Expected date: 05/13/2025  -     TSH and Free T4; Future; Expected date: 05/13/2025    I did discuss with the patient at this time this is likely sciatica he was asked to use prednisone once a day for the next 7 days as well as a heating pad.  Patient states that he will be following up with orthopedics for further recommendations as they are considering surgery for him.        3. Sciatica of right side  -     predniSONE; Take 1 tablet (50 mg total) by mouth daily for 7 days.  Dispense: 7 tablet; Refill: 0    See above      4. Hypercholesterolemia  -     CBC With Differential With Platelet; Future; Expected date: 05/13/2025  -     Comp Metabolic Panel (14); Future; Expected date: 05/13/2025  -     Lipid Panel; Future; Expected date: 05/13/2025  -     Triiodothyronine (T3) Total; Future; Expected date: 05/13/2025  -     TSH and Free T4; Future; Expected date: 05/13/2025    Did discuss with the patient to update his blood work he needed a fasting lipid panel as well as CMP        5. Elevated glucose  -     CBC With Differential With Platelet; Future; Expected date: 05/13/2025  -     Comp Metabolic Panel (14); Future; Expected date: 05/13/2025  -     Lipid Panel; Future; Expected date: 05/13/2025  -     Triiodothyronine (T3) Total; Future; Expected date: 05/13/2025  -     TSH and Free T4; Future;  Expected date: 05/13/2025      Did discuss with the patient we will be updating a CMP      6. Screening for malignant neoplasm of prostate  -     CBC With Differential With Platelet; Future; Expected date: 05/13/2025  -     Comp Metabolic Panel (14); Future; Expected date: 05/13/2025  -     Lipid Panel; Future; Expected date: 05/13/2025  -     Triiodothyronine (T3) Total; Future; Expected date: 05/13/2025  -     TSH and Free T4; Future; Expected date: 05/13/2025  -     PSA Total, Screen; Future; Expected date: 05/13/2025  7. Tinnitus of left ear  -     ENT Referral - In Network      Did discuss with the patient to continue to follow-up with ENT for further recommendations.      8. Hearing loss of left ear, unspecified hearing loss type  -     ENT Referral - In Network        The patient presented today for Medicare annual wellness visit.  During the course of today's visit the health risk assessment past medical family and social histories were updated and reviewed with the patient.  The patient was educated and counseled about appropriate screening and preventative services and these were ordered as appropriate.  Referrals for educational and counseling services were made where indicated.  Advance directives were discussed.  A copy of the recommended preventative screenings as well as discharge instructions were provided to the patient.  End-of-life planning was discussed advanced directives were also discussed and are in place.            [unfilled]            The patient indicates understanding of these issues and agrees to the plan.  Continue with current treatment plan.  Patient reassured.  Prescription medication ordered.  Reinforced healthy diet, lifestyle, and exercise.        No follow-ups on file.     Donnie Ghosh MD, 5/13/2025     Supplementary Documentation:   General Health:  In the past six months, have you lost more than 10 pounds without trying?: 2 - No  Has your appetite been poor?:  No  Type of Diet: Balanced  How does the patient maintain a good energy level?: Daily Walks  How would you describe your daily physical activity?: Moderate  How would you describe your current health state?: Good  How do you maintain positive mental well-being?: Visiting Friends, Games, Puzzles, Social Interaction, Visiting Family  On a scale of 0 to 10, with 0 being no pain and 10 being severe pain, what is your pain level?: 8 - (Severe) (hip and sciatic pain)  In the past six months, have you experienced urine leakage?: 0-No  At any time do you feel concerned for the safety/well-being of yourself and/or your children, in your home or elsewhere?: No  Have you had any immunizations at another office such as Influenza, Hepatitis B, Tetanus, or Pneumococcal?: No    Health Maintenance   Topic Date Due    Zoster Vaccines (1 of 2) Never done    COVID-19 Vaccine ( season) Never done    Annual Well Visit  2025    Influenza Vaccine (Season Ended) 10/01/2025    PSA  2026    Colorectal Cancer Screening  2027    Annual Depression Screening  Completed    Fall Risk Screening (Annual)  Completed    Pneumococcal Vaccine: 50+ Years  Completed    Meningococcal B Vaccine  Aged Out            [1]   Patient Active Problem List  Diagnosis    Flatulence, eructation and gas pain   [2]   Outpatient Medications Marked as Taking for the 25 encounter (Office Visit) with Donnie Ghosh MD   Medication Sig    predniSONE 50 MG Oral Tab Take 1 tablet (50 mg total) by mouth daily for 7 days.   [3]   Social History  Tobacco Use   Smoking Status Former    Current packs/day: 0.00    Types: Cigarettes    Quit date:     Years since quittin.3   Smokeless Tobacco Never

## 2025-05-22 ENCOUNTER — TELEPHONE (OUTPATIENT)
Dept: FAMILY MEDICINE CLINIC | Facility: CLINIC | Age: 75
End: 2025-05-22

## 2025-05-22 DIAGNOSIS — M51.26 HERNIATED LUMBAR INTERVERTEBRAL DISC: ICD-10-CM

## 2025-05-22 DIAGNOSIS — E78.00 HYPERCHOLESTEROLEMIA: ICD-10-CM

## 2025-05-22 DIAGNOSIS — R73.09 ELEVATED GLUCOSE: ICD-10-CM

## 2025-05-22 DIAGNOSIS — Z12.5 SCREENING FOR MALIGNANT NEOPLASM OF PROSTATE: ICD-10-CM

## 2025-05-22 NOTE — TELEPHONE ENCOUNTER
Patient called to let us know that he will do his lab at [email or web address] in [place]. Patient requests us to fax over the lab orders to them as he will be doing the bloodwork tomorrow.     [facility] fax # is [phone].

## 2025-05-24 LAB
ABSOLUTE BASOPHILS: 40 CELLS/UL (ref 0–200)
ABSOLUTE EOSINOPHILS: 231 CELLS/UL (ref 15–500)
ABSOLUTE LYMPHOCYTES: 2482 CELLS/UL (ref 850–3900)
ABSOLUTE MONOCYTES: 620 CELLS/UL (ref 200–950)
ABSOLUTE NEUTROPHILS: 3227 CELLS/UL (ref 1500–7800)
ALBUMIN/GLOBULIN RATIO: 1.7 (CALC) (ref 1–2.5)
ALBUMIN: 4.3 G/DL (ref 3.6–5.1)
ALKALINE PHOSPHATASE: 80 U/L (ref 35–144)
ALT: 21 U/L (ref 9–46)
AST: 15 U/L (ref 10–35)
BASOPHILS: 0.6 %
BILIRUBIN, TOTAL: 0.7 MG/DL (ref 0.2–1.2)
BUN: 20 MG/DL (ref 7–25)
CALCIUM: 9.2 MG/DL (ref 8.6–10.3)
CARBON DIOXIDE: 26 MMOL/L (ref 20–32)
CHLORIDE: 104 MMOL/L (ref 98–110)
CHOL/HDLC RATIO: 4.3 (CALC)
CHOLESTEROL, TOTAL: 202 MG/DL
CREATININE: 1.06 MG/DL (ref 0.7–1.28)
EGFR: 74 ML/MIN/1.73M2
EOSINOPHILS: 3.5 %
GLOBULIN: 2.6 G/DL (CALC) (ref 1.9–3.7)
GLUCOSE: 96 MG/DL (ref 65–99)
HDL CHOLESTEROL: 47 MG/DL
HEMATOCRIT: 44.4 % (ref 38.5–50)
HEMOGLOBIN: 14.8 G/DL (ref 13.2–17.1)
LDL-CHOLESTEROL: 126 MG/DL (CALC)
LYMPHOCYTES: 37.6 %
MCH: 31.3 PG (ref 27–33)
MCHC: 33.3 G/DL (ref 32–36)
MCV: 93.9 FL (ref 80–100)
MONOCYTES: 9.4 %
MPV: 10.2 FL (ref 7.5–12.5)
NEUTROPHILS: 48.9 %
NON-HDL CHOLESTEROL: 155 MG/DL (CALC)
PLATELET COUNT: 269 THOUSAND/UL (ref 140–400)
POTASSIUM: 4.1 MMOL/L (ref 3.5–5.3)
PROTEIN, TOTAL: 6.9 G/DL (ref 6.1–8.1)
RDW: 12.6 % (ref 11–15)
RED BLOOD CELL COUNT: 4.73 MILLION/UL (ref 4.2–5.8)
SODIUM: 140 MMOL/L (ref 135–146)
T3, TOTAL: 102 NG/DL (ref 76–181)
T4, FREE: 1.2 NG/DL (ref 0.8–1.8)
TRIGLYCERIDES: 169 MG/DL
TSH: 3.43 MIU/L (ref 0.4–4.5)
WHITE BLOOD CELL COUNT: 6.6 THOUSAND/UL (ref 3.8–10.8)

## 2025-05-27 DIAGNOSIS — E78.00 HYPERCHOLESTEROLEMIA: Primary | ICD-10-CM

## 2025-05-27 RX ORDER — LOVASTATIN 40 MG/1
40 TABLET ORAL NIGHTLY
Qty: 90 TABLET | Refills: 0 | Status: SHIPPED | OUTPATIENT
Start: 2025-05-27 | End: 2025-08-25

## 2025-06-04 ENCOUNTER — TELEPHONE (OUTPATIENT)
Dept: FAMILY MEDICINE CLINIC | Facility: CLINIC | Age: 75
End: 2025-06-04

## 2025-09-10 ENCOUNTER — APPOINTMENT (OUTPATIENT)
Age: 75
End: 2025-09-10